# Patient Record
Sex: FEMALE | Race: WHITE | NOT HISPANIC OR LATINO | URBAN - METROPOLITAN AREA
[De-identification: names, ages, dates, MRNs, and addresses within clinical notes are randomized per-mention and may not be internally consistent; named-entity substitution may affect disease eponyms.]

---

## 2024-11-04 ENCOUNTER — TELEMEDICINE (OUTPATIENT)
Dept: FAMILY MEDICINE CLINIC | Facility: TELEHEALTH | Age: 44
End: 2024-11-04
Payer: COMMERCIAL

## 2024-11-04 VITALS — HEART RATE: 66 BPM | TEMPERATURE: 98.9 F

## 2024-11-04 DIAGNOSIS — H66.001 ACUTE SUPPURATIVE OTITIS MEDIA OF RIGHT EAR WITHOUT SPONTANEOUS RUPTURE OF TYMPANIC MEMBRANE, RECURRENCE NOT SPECIFIED: ICD-10-CM

## 2024-11-04 DIAGNOSIS — J06.9 UPPER RESPIRATORY TRACT INFECTION, UNSPECIFIED TYPE: Primary | ICD-10-CM

## 2024-11-04 PROCEDURE — 99213 OFFICE O/P EST LOW 20 MIN: CPT | Performed by: NURSE PRACTITIONER

## 2024-11-04 RX ORDER — PREDNISONE 10 MG/1
TABLET ORAL
Qty: 21 TABLET | Refills: 0 | Status: SHIPPED | OUTPATIENT
Start: 2024-11-04

## 2024-11-04 RX ORDER — ATOVAQUONE AND PROGUANIL HYDROCHLORIDE 250; 100 MG/1; MG/1
TABLET, FILM COATED ORAL DAILY
COMMUNITY

## 2024-11-04 NOTE — PROGRESS NOTES
You have chosen to receive care through a telehealth visit.  Do you consent to use a video/audio connection for your medical care today? Yes     CHIEF COMPLAINT  No chief complaint on file.        HPI  Tana Segovia is a 44 y.o. female  presents with complaint of 4 day history of sinus congestin, sore throat resolved, nasal congestion with clear discharge, swollen tender lymph nodes, PND, clearing throat from PND, mild ear pain.  Denies fever, body aches, chills, wheezing, shortness of breath.     Review of Systems  See HPI    Past Medical History:   Diagnosis Date    Celiac disease     Endocarditis     GERD (gastroesophageal reflux disease)     Seasonal allergies        No family history on file.    Social History     Socioeconomic History    Marital status: Unknown   Tobacco Use    Smoking status: Never     Passive exposure: Never    Smokeless tobacco: Never   Vaping Use    Vaping status: Never Used   Substance and Sexual Activity    Alcohol use: Yes    Drug use: Not Currently    Sexual activity: Defer       Tana Segovia  reports that she has never smoked. She has never been exposed to tobacco smoke. She has never used smokeless tobacco.               Pulse 66   Temp 98.9 °F (37.2 °C)     PHYSICAL EXAM  Physical Exam   Constitutional: She is oriented to person, place, and time. She appears well-developed and well-nourished. She does not have a sickly appearance. She does not appear ill.   HENT:   Head: Normocephalic and atraumatic.   Bilateral TMs are intact, cloudy effusion, right TM has mod-severe erythema; left TM has mild erythema; oropharynx is erythematous with PND and cobblestoning, 1+ bilateral tonsillar enlargement   Pulmonary/Chest: Effort normal.  No respiratory distress.  Neurological: She is alert and oriented to person, place, and time.       Results for orders placed or performed during the hospital encounter of 11/04/24   POC Strep A, PCR (Roche Tammie)    Collection Time: 11/04/24  9:47 AM     Specimen: Swab   Result Value Ref Range    STREP A PCR Not Detected     Internal Control Passed     Lot Number [id number]     Expiration Date 5/31/26    MOSES FLU + SARS PCR    Collection Time: 11/04/24  9:58 AM    Specimen: Nasal Cavity; Swab   Result Value Ref Range    COVID19 Not Detected Not Detected - Ref. Range    POC Influenza A, Molecular Not Detected Negative / Not Detected    POC Influenza B, Molecular Not Detected Negative / Not Detected    Internal Control Passed Passed    Lot Number [id number]     Expiration Date 3/31/26        Diagnoses and all orders for this visit:    1. Upper respiratory tract infection, unspecified type (Primary)  -     MOSES FLU + SARS PCR; Future  -     POC Strep A, PCR (Roche [facility]); Future    2. Acute suppurative otitis media of right ear without spontaneous rupture of tympanic membrane, recurrence not specified  -     amoxicillin-clavulanate (AUGMENTIN) 875-125 MG per tablet; Take 1 tablet by mouth 2 (Two) Times a Day for 10 days.  Dispense: 20 tablet; Refill: 0  -     predniSONE (DELTASONE) 10 MG (21) dose pack; Use as directed on package  Dispense: 21 tablet; Refill: 0    --take medications as prescribed  --increase fluids, rest as needed, tylenol or ibuprofen for pain  --f/u in 5-7 days if no improvement        FOLLOW-UP  As discussed during visit with PCP/[facility] if no improvement or Urgent Care/Emergency Department if worsening of symptoms    Patient verbalizes understanding of medication dosage, comfort measures, instructions for treatment and follow-up.    Tammy Russ, APRN  11/04/2024  09:18 EST    Mode of Visit: Video  Location of patient: Other: [facility]  Location of provider: Home  You have chosen to receive care through a telehealth visit.  The patient has signed the video visit consent form.  The visit included audio and video interaction. No technical issues occurred during this visit.      Note Disclaimer: [facility], we believe that sharing  information builds trust and better   relationships. You are receiving this note because you recently visited [facility]. It is possible you   will see health information before a provider has talked with you about it. This kind of information can   be easy to misunderstand. To help you fully understand what it means for your health, we urge you to   discuss this note with your provider.

## 2025-03-11 ENCOUNTER — TREATMENT (OUTPATIENT)
Dept: PHYSICAL THERAPY | Facility: CLINIC | Age: 45
End: 2025-03-11
Payer: COMMERCIAL

## 2025-03-11 ENCOUNTER — OFFICE VISIT (OUTPATIENT)
Age: 45
End: 2025-03-11
Payer: COMMERCIAL

## 2025-03-11 VITALS
WEIGHT: 214 LBS | BODY MASS INDEX: 33.59 KG/M2 | HEIGHT: 67 IN | SYSTOLIC BLOOD PRESSURE: 120 MMHG | DIASTOLIC BLOOD PRESSURE: 70 MMHG

## 2025-03-11 DIAGNOSIS — S62.609D CLOSED FRACTURE OF PHALANX OF DIGIT OF HAND WITH ROUTINE HEALING, SUBSEQUENT ENCOUNTER: Primary | ICD-10-CM

## 2025-03-11 DIAGNOSIS — S62.633A CLOSED DISPLACED FRACTURE OF DISTAL PHALANX OF LEFT MIDDLE FINGER, INITIAL ENCOUNTER: Primary | ICD-10-CM

## 2025-03-11 PROBLEM — S62.609A FX PHALANGES, HAND-CLOSED: Status: ACTIVE | Noted: 2025-03-11

## 2025-03-11 NOTE — PROGRESS NOTES
Tana Segovia 1980   Diagnosis/ Surgery: left long finger distal phalanx fracture               Date Of Injury: 3/10/25    Date Of Surgery:NA    Hand Dominance: right   History of Present Condition: dog pulled her while she had leash in hand and hit hand on armrest.   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     Pain: moderate     Edema: moderate dorsal DIPJ  Sensibility: WNL   Wound Status:bruising   ROM/ Strength: NT     Splinting:  Patient was measure and fit with a custom fabricated digital cap splint.    Patient was instructed in wearing schedule, precautions and care of the splint during this visit.   Patient was instructed in proper donning/doffing of splint.   Assessment:  Patient was fitted and appropriate splint was fabricated this date.  Patient reported that splint was comfortable and had no complications with the fit of the splint.  Patient was instructed and patient verbalized understanding of precautions, wear and care of the splint.   Patient demonstrated independent donning/doffing of splint during treatment today.  Goals:  Patient was fitted properly with appropriate splint for diagnosis  Patient was educated on precautions, wear schedule and care of splint  Patient demonstrated independence with donning/doffing of the splint.  Splint was provided to Protect Healing Structures, Restrict Mobility, Improve joint alignment.  Plan:  No additional treatment is required for this patient at this time. The patient is therefore discharged from therapy.  Patient advised to contact therapist with any additional questions or concerns regarding the fit and function of the splint.  Patient will be seen for splint issues as needed   Wear Instructions: Off for hygiene           PT SIGNATURE: Zaida Alexander, PT   DATE TREATMENT INITIATED: 3/11/2025    Initial Certification  Certification Period: 6/9/2025  I certify that the therapy services are furnished  while this patient is under my care.  The services outlined above are required by this patient, and will be reviewed every 90 days.     PHYSICIAN:       DATE:     Please sign and return via fax to [phone].. Thank you, [facility].

## 2025-03-11 NOTE — PROGRESS NOTES
"                                                                 [facility]     Office Visit       Date: 03/11/2025   Patient Name: Tana Segovia  MRN: [id number]  YOB: 1980    Referring Physician: Lilibeth Leon APRN     Chief Complaint:   Chief Complaint   Patient presents with    Left Hand - Pain     History of Present Illness:   Tana Segovia is a 44 y.o. female right-hand-dominant presented clinic as a new patient complaints of left long finger pain.  She reports that on 3/9/2025 she sustained an injury to the long finger.  She felt immediate pain and swelling.  She went to the urgent care 1 day later where x-rays were obtained demonstrating a small avulsion fracture of the distal phalanx.  She is placed in a splint and instructed follow-up.  She reports that her pain is 2/10.  She has been compliant with splint wear.  No numbness or tingling.  No other complaints or concerns.    PMH: Celiac disease, GERD, seasonal allergies.    Subjective   Review of Systems:   Review of Systems   Pertinent review of systems per HPI    I reviewed the patient's chief complaint, history of present illness, review of systems, past medical history, surgical history, family history, social history, medications and allergy list in the EMR on 03/11/2025 and agree with the findings above.    Objective    Vital Signs:   Vitals:    03/11/25 1039   BP: 120/70   Weight: 97.1 kg (214 lb)   Height: 170.2 cm (67.01\")     General: No acute distress. Alert and oriented.   Cardiovascular: Palpable radial pulse.   Respiratory: Breathing is nonlabored.   Ortho Exam:  Examination of the left long finger demonstrates mild swelling and ecchymosis along the ulnar aspect of the DIP joint.  No subungual hematoma.  The nail plate is tender to the  Nailbed.  She is tender along the DIP joint ulnarly.  The DIP joint is stable to radial ulnar stresses.  Painless short arc range of motion of the DIP joint.  She is able to " make a composite fist.  Sensations intact light touch at the hand.  Warm and well-perfused distally.    Imaging / Studies:    Imaging Results (Last 24 Hours)       ** No results found for the last 24 hours. **        Left long finger x-rays obtained on 3/10/2025 were personally reviewed interpreted by myself.  There is a small cortical avulsion noted along the ulnar aspect of the distal phalanx near the DIP joint.  The DIP joint is concentrically reduced.  No deformities.  Normal soft tissue window.    Assessment / Plan    Assessment/Plan:   Tana Segovia is a 44 y.o. female with a left long finger distal phalanx ulnar avulsion fracture, DOI/9/25.    I discussed with the patient their clinical and radiographic findings demonstrate a distal phalanx ulnar avulsion fracture.  We had a lengthy discussion regarding the pathophysiology of their diagnosis.  I reviewed the patient her radiographs and relevant anatomy.  Her fracture is acceptable for nonoperative treatment.  She will be placed in a custom thermoplastic cap splint today per the hand therapist.  I would like her to wear this at all times for the next 3 weeks.  She may remove for hygiene and gentle desensitization to the fingertip.  See her back in 3 weeks for reevaluation and discontinuation of the splint.  They were agreeable with the plan.  All questions and concerns were addressed.       ICD-10-CM ICD-9-CM   1. Closed fracture of phalanx of digit of hand with routine healing, subsequent encounter  S62.609D V54.19     Follow Up:   Return in about 3 weeks (around 4/1/2025).      Nella Bradford MD  [facility]

## 2025-03-21 ENCOUNTER — OFFICE VISIT (OUTPATIENT)
Dept: INTERNAL MEDICINE | Facility: CLINIC | Age: 45
End: 2025-03-21
Payer: COMMERCIAL

## 2025-03-21 VITALS
HEIGHT: 67 IN | OXYGEN SATURATION: 98 % | RESPIRATION RATE: 16 BRPM | SYSTOLIC BLOOD PRESSURE: 120 MMHG | WEIGHT: 216.2 LBS | HEART RATE: 74 BPM | TEMPERATURE: 96.7 F | DIASTOLIC BLOOD PRESSURE: 86 MMHG | BODY MASS INDEX: 33.93 KG/M2

## 2025-03-21 DIAGNOSIS — Z87.74 S/P VSD REPAIR: ICD-10-CM

## 2025-03-21 DIAGNOSIS — Z98.890 H/O TRICUSPID VALVE REPAIR: ICD-10-CM

## 2025-03-21 DIAGNOSIS — K90.0 CELIAC DISEASE: ICD-10-CM

## 2025-03-21 DIAGNOSIS — E66.811 CLASS 1 OBESITY DUE TO EXCESS CALORIES WITHOUT SERIOUS COMORBIDITY WITH BODY MASS INDEX (BMI) OF 33.0 TO 33.9 IN ADULT: ICD-10-CM

## 2025-03-21 DIAGNOSIS — Z76.89 ENCOUNTER TO ESTABLISH CARE: Primary | ICD-10-CM

## 2025-03-21 DIAGNOSIS — E66.09 CLASS 1 OBESITY DUE TO EXCESS CALORIES WITHOUT SERIOUS COMORBIDITY WITH BODY MASS INDEX (BMI) OF 33.0 TO 33.9 IN ADULT: ICD-10-CM

## 2025-03-21 DIAGNOSIS — E55.9 VITAMIN D DEFICIENCY: ICD-10-CM

## 2025-03-21 DIAGNOSIS — R73.03 PREDIABETES: ICD-10-CM

## 2025-03-21 PROBLEM — Z86.79 PERSONAL HISTORY OF OTHER DISEASES OF THE CIRCULATORY SYSTEM: Status: ACTIVE | Noted: 2023-09-20

## 2025-03-21 PROBLEM — Q21.0 VSD (VENTRICULAR SEPTAL DEFECT): Status: ACTIVE | Noted: 2025-03-21

## 2025-03-21 PROBLEM — N63.20 MASS OF LEFT BREAST: Status: ACTIVE | Noted: 2025-02-19

## 2025-03-21 PROBLEM — E53.8 FOLIC ACID DEFICIENCY: Status: ACTIVE | Noted: 2024-11-14

## 2025-03-21 PROBLEM — I26.99 OTHER PULMONARY EMBOLISM WITHOUT ACUTE COR PULMONALE: Status: ACTIVE | Noted: 2020-07-18

## 2025-03-21 PROBLEM — I49.3 PVC (PREMATURE VENTRICULAR CONTRACTION): Status: ACTIVE | Noted: 2023-10-01

## 2025-03-21 PROBLEM — Z86.2 HISTORY OF ANEMIA DUE TO VITAMIN B12 DEFICIENCY: Status: ACTIVE | Noted: 2024-11-08

## 2025-03-21 PROBLEM — R00.2 PALPITATIONS: Status: ACTIVE | Noted: 2024-11-08

## 2025-03-21 PROBLEM — I33.0 INFECTIVE ENDOCARDITIS: Status: ACTIVE | Noted: 2020-07-04

## 2025-03-21 PROBLEM — K21.9 GASTROESOPHAGEAL REFLUX DISEASE WITHOUT ESOPHAGITIS: Status: ACTIVE | Noted: 2025-03-21

## 2025-03-21 PROBLEM — I76 SEPTIC EMBOLISM: Status: ACTIVE | Noted: 2020-07-11

## 2025-03-21 PROBLEM — Z86.79 HISTORY OF ENDOCARDITIS: Status: ACTIVE | Noted: 2025-03-21

## 2025-03-21 PROBLEM — I07.1 TRICUSPID VALVE REGURGITATION: Status: ACTIVE | Noted: 2020-07-15

## 2025-03-21 PROBLEM — N92.0 MENORRHAGIA: Status: ACTIVE | Noted: 2023-07-06

## 2025-03-21 NOTE — PROGRESS NOTES
New Patient Office Visit      Date: 2025  Patient Name: Tana Segovia  : 1980   MRN: [id number]     Chief Complaint:    Chief Complaint   Patient presents with    Establish Care    Finger Injury       History of Present Illness: Tana Segovia is a 44 y.o. female who is here today to establish care.  Past medical, surgical, family and social history as outlined below.  Last physical with previous PCP 2024.  Patient shows me lab results from this visit, normal thyroid, CMP, CBC.  Hemoglobin A1c 5.8.  Low vitamin B12 and folate-these were since rechecked and within normal limits.  Last mammogram on 2024.  Over the last 6 months patient has increased her physical activity and has been following with a dietitian.  Has noticed she has decreased 1 pant size.    Patient has no complaints today.  Had recent left ulnar avulsion fracture.  Patient followed up in office with Dr. Bradford on 2025.  Recommend splint for 3 weeks and will see patient back in office.  Patient reports pain is significantly improved.    Subjective      Review of Systems:   Review of Systems   Constitutional:  Negative for chills and fever.   HENT:  Negative for congestion and rhinorrhea.    Respiratory:  Negative for shortness of breath.    Cardiovascular:  Negative for chest pain.   Gastrointestinal:  Negative for abdominal pain, constipation and diarrhea.   Endocrine: Negative for polydipsia and polyuria.   Genitourinary:  Negative for dysuria.   Musculoskeletal:  Negative for myalgias.   Skin:  Negative for skin lesions.   Allergic/Immunologic: Negative for environmental allergies.   Neurological:  Negative for dizziness and headache.   Psychiatric/Behavioral:  Negative for suicidal ideas.        Past Medical History:   Past Medical History:   Diagnosis Date    Celiac disease     Diabetes mellitus 2024    Slightly elevated A1C    Endocarditis 2020    Fracture, finger 3/2025    GERD  (gastroesophageal reflux disease) 2000    Mainly associated with celiac    Heart murmur 1981    VSD    Obesity     Gained 30 lbs over the last couple of years.    Seasonal allergies        Past Surgical History:   Past Surgical History:   Procedure Laterality Date    CARDIAC SURGERY  07/2020    VSD repair    TUBAL ABDOMINAL LIGATION      WISDOM TOOTH EXTRACTION         Family History:   Family History   Problem Relation Age of Onset    Anxiety disorder Mother     Arthritis Mother     Birth defects Mother         Scoliosis    Diabetes Mother         Pre-diabetic for awhile    Hyperlipidemia Mother     Scoliosis Mother     Hyperlipidemia Father     Arthritis Maternal Grandmother        Social History:   Social History     Socioeconomic History    Marital status:    Tobacco Use    Smoking status: Never     Passive exposure: Never    Smokeless tobacco: Never   Vaping Use    Vaping status: Never Used   Substance and Sexual Activity    Alcohol use: Yes     Alcohol/week: 4.0 standard drinks of alcohol     Types: 4 Glasses of wine per week    Drug use: Never    Sexual activity: Yes     Partners: Male     Birth control/protection: Tubal ligation       Medications:     Current Outpatient Medications:     aspirin 81 MG EC tablet, Take 1 tablet by mouth Daily., Disp: , Rfl:     atovaquone-proguanil (MALARONE) 250-100 MG tablet per tablet, Take  by mouth Daily. prn, Disp: , Rfl:     famotidine (Pepcid) 20 MG tablet, Take 1 tablet as needed by oral route., Disp: , Rfl:     fluticasone (FLONASE) 50 MCG/ACT nasal spray, into the nostril(s) as directed by provider., Disp: , Rfl:     folic acid (FOLVITE) 1 MG tablet, Take 1 tablet by mouth Daily., Disp: , Rfl:     Loratadine (Claritin) 10 MG capsule, Take  by mouth., Disp: , Rfl:     metoprolol succinate XL (TOPROL-XL) 50 MG 24 hr tablet, Take 1 tablet (50 mg) by mouth in the morning and 0.5 tablet (25 mg) in the evening., Disp: , Rfl:     Vitamin D, Cholecalciferol, 50 MCG  "(2000 UT) capsule, Take  by mouth., Disp: , Rfl:     Allergies:   Allergies   Allergen Reactions    Gluten Meal GI Intolerance     Per patient due to celiac disease    Sulfa Antibiotics Nausea Only    Adhesive Tape Rash       Objective     Physical Exam:  Vital Signs:   Vitals:    03/21/25 0956   BP: 120/86   Pulse: 74   Resp: 16   Temp: 96.7 °F (35.9 °C)   TempSrc: Temporal   SpO2: 98%   Weight: 98.1 kg (216 lb 3.2 oz)   Height: 170.2 cm (67.01\")   PainSc: 0-No pain     Body mass index is 33.85 kg/m².   Facility age limit for growth %viraj is 20 years.       Physical Exam  Vitals and nursing note reviewed.   Constitutional:       General: She is not in acute distress.     Appearance: Normal appearance.   Eyes:      Extraocular Movements: Extraocular movements intact.      Conjunctiva/sclera: Conjunctivae normal.   Pulmonary:      Effort: Pulmonary effort is normal. No respiratory distress.   Neurological:      General: No focal deficit present.      Mental Status: She is alert and oriented to person, place, and time. Mental status is at baseline.   Psychiatric:         Mood and Affect: Mood normal.         Behavior: Behavior normal.         Assessment / Plan      Assessment/Plan:   Diagnoses and all orders for this visit:    1. Encounter to establish care (Primary)    2. Prediabetes    3. Celiac disease    4. Class 1 obesity due to excess calories without serious comorbidity with body mass index (BMI) of 33.0 to 33.9 in adult    5. H/O tricuspid valve repair    6. S/P VSD repair    7. Vitamin D deficiency         Follow Up:   Return in about 8 months (around 11/10/2025) for Annual.      GRAEME [facility]   "

## 2025-04-03 ENCOUNTER — OFFICE VISIT (OUTPATIENT)
Dept: ORTHOPEDIC SURGERY | Facility: CLINIC | Age: 45
End: 2025-04-03
Payer: COMMERCIAL

## 2025-04-03 VITALS
HEIGHT: 67 IN | DIASTOLIC BLOOD PRESSURE: 82 MMHG | WEIGHT: 216 LBS | BODY MASS INDEX: 33.9 KG/M2 | SYSTOLIC BLOOD PRESSURE: 120 MMHG

## 2025-04-03 DIAGNOSIS — S62.609D CLOSED FRACTURE OF PHALANX OF DIGIT OF HAND WITH ROUTINE HEALING, SUBSEQUENT ENCOUNTER: Primary | ICD-10-CM

## 2025-04-03 NOTE — PROGRESS NOTES
[facility]     Office Visit       Date: 04/03/2025   Patient Name: Tana Segovia  MRN: [id number]  YOB: 1980    Referring Physician: No ref. provider found     Chief Complaint:   Chief Complaint   Patient presents with    Follow-up     3 week f/u -- left long finger distal phalanx ulnar avulsion fracture, DOI 3/9/25      History of Present Illness:   Tana Segovia is a 44 y.o. female right-hand-dominant presented clinic for follow-up of left long finger distal phalanx ulnar avulsion fracture, DOI 3/9/2025.  At her last clinic visit she was placed in a custom thermoplastic splint per the hand therapist.  She reports that she has been using the splint but has been weaning out over the past couple days.  She has had improvements in her pain swelling and bruising.  She denies any pain currently.  Overall she feels improved.  No other complaints or concerns.    Subjective   Review of Systems:   Review of Systems   Constitutional: Negative.  Negative for chills, fatigue and fever.   HENT: Negative.  Negative for congestion and dental problem.    Eyes: Negative.  Negative for blurred vision.   Respiratory: Negative.  Negative for shortness of breath.    Cardiovascular: Negative.  Negative for leg swelling.   Gastrointestinal: Negative.  Negative for abdominal pain.   Endocrine: Negative.  Negative for polyuria.   Genitourinary: Negative.  Negative for difficulty urinating.   Musculoskeletal:  Positive for arthralgias.   Skin: Negative.    Allergic/Immunologic: Negative.    Neurological: Negative.    Hematological: Negative.  Negative for adenopathy.   Psychiatric/Behavioral: Negative.  Negative for behavioral problems.       Pertinent review of systems per HPI    I reviewed the patient's chief complaint, history of present illness, review of systems, past medical history, surgical history, family history, social history,  "medications and allergy list in the EMR on 04/03/2025 and agree with the findings above.    Objective    Vital Signs:   Vitals:    04/03/25 1012   BP: 120/82   Weight: 98 kg (216 lb)   Height: 170.2 cm (67.01\")     General: No acute distress. Alert and oriented.   Cardiovascular: Palpable radial pulse.   Respiratory: Breathing is nonlabored.   Ortho Exam:  Examination of the left long finger demonstrates mild tenderness along the ulnar aspect of the DIP joint.  Improvement in the swelling and ecchymosis.  Nontender throughout remainder of the digit.  The DIP joint is stable to radial ulnar stresses.  She is 1 cm to palm with composite fist.  Sensations intact light touch at the hand.  Warm and well-perfused distally.    Imaging / Studies:    Imaging Results (Last 24 Hours)       ** No results found for the last 24 hours. **          Assessment / Plan    Assessment/Plan:   Tana Segovia is a 44 y.o. female with left long finger distal phalanx ulnar avulsion fracture, DOI 3/9/2025.    Patient has done well since her injury with a course of conservative treatment.  She may discontinue use of her splint.  I discussed unrestricted range of motion of the digit and the importance of making a composite fist.  I offered her a follow-up appointment 6 weeks, however she like to call if she is not improving.  This is reasonable.  Otherwise she may follow-up with me as needed.  All question concerns were addressed.  She is agreeable.      ICD-10-CM ICD-9-CM   1. Closed fracture of phalanx of digit of hand with routine healing, subsequent encounter  S62.609D V54.19     Follow Up:   Return if symptoms worsen or fail to improve.      Nella Bradford MD  [facility]  "

## 2025-07-07 ENCOUNTER — OFFICE VISIT (OUTPATIENT)
Dept: ORTHOPEDIC SURGERY | Facility: CLINIC | Age: 45
End: 2025-07-07
Payer: COMMERCIAL

## 2025-07-07 VITALS
BODY MASS INDEX: 33.3 KG/M2 | WEIGHT: 212.2 LBS | SYSTOLIC BLOOD PRESSURE: 114 MMHG | HEIGHT: 67 IN | DIASTOLIC BLOOD PRESSURE: 86 MMHG

## 2025-07-07 DIAGNOSIS — M79.645 FINGER PAIN, LEFT: ICD-10-CM

## 2025-07-07 DIAGNOSIS — M67.442 GANGLION CYST OF FINGER OF LEFT HAND: Primary | ICD-10-CM

## 2025-07-07 PROCEDURE — 99214 OFFICE O/P EST MOD 30 MIN: CPT | Performed by: STUDENT IN AN ORGANIZED HEALTH CARE EDUCATION/TRAINING PROGRAM

## 2025-07-07 RX ORDER — TIRZEPATIDE 2.5 MG/.5ML
INJECTION, SOLUTION SUBCUTANEOUS
COMMUNITY

## 2025-07-07 NOTE — PROGRESS NOTES
[facility] Orthopedic     Office Visit       Date: 07/07/2025   Patient Name: Tana Segovia  MRN: [id number]  YOB: 1980    Referring Physician: No ref. provider found     Chief Complaint:   Chief Complaint   Patient presents with    Left Hand - Pain     History of Present Illness:   Tana Segovia is a 44 y.o. female right-hand-dominant presented clinic as an established patient for a new problem.  I have treated the patient in the past for a left long finger distal phalanx ulnar avulsion fracture, DOI 3/9/2025.  She reports that she has had a small cyst on the dorsal aspect of her left ring finger for the past 3 to 4 years.  This has not been growing in size recently.  This creates stiffness and also pain when bumped or hit.  No other complaints or concerns.    Subjective   Review of Systems:   Review of Systems   Constitutional:  Negative for chills, fever, unexpected weight gain and unexpected weight loss.   HENT:  Negative for congestion, postnasal drip and rhinorrhea.    Eyes:  Negative for blurred vision.   Respiratory:  Negative for shortness of breath.    Cardiovascular:  Negative for leg swelling.   Gastrointestinal:  Negative for abdominal pain, nausea and vomiting.   Genitourinary:  Negative for difficulty urinating.   Musculoskeletal:  Positive for arthralgias. Negative for gait problem, joint swelling and myalgias.   Skin:  Negative for skin lesions and wound.   Neurological:  Negative for dizziness, weakness, light-headedness and numbness.   Hematological:  Does not bruise/bleed easily.   Psychiatric/Behavioral:  Negative for depressed mood.       Pertinent review of systems per HPI    I reviewed the patient's chief complaint, history of present illness, review of systems, past medical history, surgical history, family history, social history, medications and allergy list in the EMR on 07/07/2025 and agree with the  "findings above.    Objective    Vital Signs:   Vitals:    07/07/25 0759   BP: 114/86   Weight: 96.3 kg (212 lb 3.2 oz)   Height: 170.2 cm (67\")     General: No acute distress. Alert and oriented.   Cardiovascular: Palpable radial pulse.   Respiratory: Breathing is nonlabored.   Ortho Exam:  Examination of the left ring finger demonstrates a small 7 x 8 mm palpable cyst along the dorsal aspect of the PIP joint.  This is somewhat mobile.  This is mildly tender to palpation.  She has full digit range of motion and is able to make a composite fist.  Sensation is intact light touch throughout the hand.  Warm perfused distally.    Imaging / Studies:    Imaging Results (Last 24 Hours)       Procedure Component Value Units Date/Time    XR Finger 2+ View Left [[id number]] Resulted: 07/07/25 0821     Updated: 07/07/25 0821    Narrative:      Left index finger X-Ray    Indication: Pain    Views:  PA, Lateral    Comparison: None    Findings:  No fractures or dislocation. No bony lesions. Normal soft tissues.   Maintained joint spaces without osteophyte formation    Impression:   No acute findings               Assessment / Plan    Assessment/Plan:   Tana Segovia is a 44 y.o. female with a left ring finger dorsal cyst.    I discussed with the patient their clinical and radiographic findings demonstrate a ring finger dorsal cyst.  We had a lengthy discussion regarding the pathophysiology of their diagnosis.  I discussed with the patient the benign nature of the cyst that originate either from the PIP joint itself or the extensor tendons.  We discussed the operative and nonoperative treatment options.  Nonoperative treatment options include oral topical anti-inflammatories, bracing, therapy, aspiration.  Definitive treatment is in the form of surgical excision.  The patient expressed understanding of all of her options and wished for definitive excision.  Explained to her the risk, benefits, alternatives and prognosis, and she " elects to proceed with left ring finger cyst excision.     The risks and benefits of the procedure were discussed with the patient and/or appropriate guardian, which include but are not limited to: the risk of bleeding, pain, infection, wound complications, neurovascular damage, post-operative stiffness, tendon and/or ligament injury, persistent pain, need for additional surgeries in the future, and general risks from anesthesia.  This excision specific risk include damage to surrounding structures, digital stiffness, recurrence, need for additional procedures.  We also discussed the post-operative rehabilitation, length of immobilization, the need for therapy, and the overall expected outcomes from the procedure. Proper time was given to answer the patient's questions regarding the procedure. The patient expressed understanding. Knowing what the risks are and what the conservative treatment is, the patient elected to forgo any further conservative treatment options and proceed with the surgical intervention. Surgical consent was obtained in the clinic and signed by myself and the patient. They will follow up with me postoperatively.       ICD-10-CM ICD-9-CM   1. Ganglion cyst of finger of left hand  M67.442 727.43   2. Finger pain, left  M79.645 729.5     Follow Up:   Return for Follow Up- After surgery.      Nella Bradford MD  [facility] & Hand Surgeon

## 2025-07-13 ENCOUNTER — TELEMEDICINE (OUTPATIENT)
Dept: FAMILY MEDICINE CLINIC | Facility: TELEHEALTH | Age: 45
End: 2025-07-13
Payer: COMMERCIAL

## 2025-07-13 ENCOUNTER — E-VISIT (OUTPATIENT)
Dept: ADMINISTRATIVE | Facility: OTHER | Age: 45
End: 2025-07-13
Payer: COMMERCIAL

## 2025-07-13 DIAGNOSIS — K59.03 DRUG-INDUCED CONSTIPATION: Primary | ICD-10-CM

## 2025-07-13 NOTE — PROGRESS NOTES
No chief complaint on file.      Video Visit Reason:   Free Text Description: Suggestions for constipation relief same day.  Subjective   Tana Segovia is a 44 y.o. female.     History of Present Illness  The patient presents via virtual visit for evaluation of constipation.    She has been experiencing constipation since starting Zepbound, with her third injection recently administered. She reports a noticeable slowing in her bowel movements, although she was able to have a bowel movement yesterday. Today, she feels the urge to defecate but anticipates significant discomfort. She describes her stool as hard and difficult to pass. Over the past 3 weeks, she has noticed a decrease in the frequency of her bowel movements, with only 2 instances of no bowel movement. She also mentions that her stools have become firmer. She is seeking advice on a fast-acting solution to soften her stool as she is due to travel by plane tomorrow and wishes to avoid discomfort during the journey. She has been using fiber gummies to manage her symptoms and has reached out to her primary care physician for further guidance.        The following portions of the patient's history were reviewed and updated as appropriate: allergies, current medications, past medical history, and problem list.      Past Medical History:   Diagnosis Date    Celiac disease     Diabetes mellitus 11/2024    Slightly elevated A1C    Endocarditis 2020    Fracture, finger 3/2025    GERD (gastroesophageal reflux disease) 2000    Mainly associated with celiac    Heart murmur 1981    VSD    Obesity     Gained 30 lbs over the last couple of years.    Seasonal allergies      Social History     Socioeconomic History    Marital status:    Tobacco Use    Smoking status: Never     Passive exposure: Never    Smokeless tobacco: Never   Vaping Use    Vaping status: Never Used   Substance and Sexual Activity    Alcohol use: Yes     Alcohol/week: 4.0 standard drinks of  alcohol     Types: 4 Glasses of wine per week    Drug use: Never    Sexual activity: Yes     Partners: Male     Birth control/protection: Tubal ligation     medication documentation: reviewed and updated with patient and   Current Outpatient Medications:     FIBER ADULT GUMMIES PO, , Disp: , Rfl:     aspirin 81 MG EC tablet, Take 1 tablet by mouth Daily., Disp: , Rfl:     atovaquone-proguanil (MALARONE) 250-100 MG tablet per tablet, Take  by mouth Daily. prn, Disp: , Rfl:     famotidine (Pepcid) 20 MG tablet, Take 1 tablet as needed by oral route., Disp: , Rfl:     fluticasone (FLONASE) 50 MCG/ACT nasal spray, into the nostril(s) as directed by provider., Disp: , Rfl:     folic acid (FOLVITE) 1 MG tablet, Take 1 tablet by mouth Daily., Disp: , Rfl:     Loratadine (Claritin) 10 MG capsule, Take  by mouth., Disp: , Rfl:     metoprolol succinate XL (TOPROL-XL) 50 MG 24 hr tablet, Take 1 tablet (50 mg) by mouth in the morning and 0.5 tablet (25 mg) in the evening., Disp: , Rfl:     Vitamin D, Cholecalciferol, 50 MCG (2000 UT) capsule, Take  by mouth., Disp: , Rfl:     Zepbound 2.5 MG/0.5ML solution auto-injector, INJECT 2.5 MG SUBCUTANEOUSLY WEEKLY FOR 28 DAYS (PA DENIED), Disp: , Rfl:   Review of Systems  See HPI  Objective   Physical Exam  Pulmonary:      Effort: Pulmonary effort is normal.   Abdominal:      Tenderness: There is no abdominal tenderness.   Neurological:      Mental Status: She is alert.         Assessment & Plan   Diagnoses and all orders for this visit:    1. Drug-induced constipation (Primary)     - Constipation likely due to Zepbound, causing stool impaction in the rectal area.  - Advised to use Fleet suppository or enema to lubricate and facilitate stool passage.  - Recommended daily MiraLAX with adequate water intake when consuming fiber gummies.  - Suggested magnesium citrate for rapid stool softening, with caution that it won't affect already formed hard stools. Consider using Colace as a stool  softener once or twice daily.             Follow Up:  If your symptoms are not resolving by the completion of your treatment or are worsening, see your primary care provider for follow up. If you don't have a primary care provider, you may go to any Urgent Care for re-evaluation. If you develop any life threatening symptoms, go to the nearest Emergency Department immediately or call EMS.       Patient or patient representative verbalized consent for the use of Ambient Listening during the visit with  ELYSIA Garnica for chart documentation. 7/13/2025  08:08 EDT        The use of  Video Visit was utilized during this visit, using both Buyou and Foodist/Epic. The use of a video visit has been reviewed with the patient and verbal informed consent has been obtained. No technical difficulties occurred during the visit.    is located at [address] [place] [id number]  Provider is located at [place], [place]

## 2025-07-13 NOTE — E-VISIT ESCALATED
Status: Referred Out  Date: 2025 06:39:07  Acuity Level: Not applicable  Referral message: Based on the information you provided during your interview, eVisit is not appropriate for treating your condition.  Patient: Tana Segovia  Patient : 1980  Patient Address: [address] [place] [address]  Patient Phone: [phone]  Clinician Response: Unavailable  Diagnosis: Unavailable  Diagnosis ICD: Unavailable     Patient Interview Questions and Responses: None available

## 2025-07-13 NOTE — PATIENT INSTRUCTIONS
Constipation, Adult  Constipation is when a person has trouble pooping (having a bowel movement). When you have this condition, you may poop fewer than 3 times a week. Your poop (stool) may also be dry, hard, or bigger than normal.  Follow these instructions at home:  Eating and drinking    Eat foods that have a lot of fiber, such as:  Fresh fruits and vegetables.  Whole grains.  Beans.  Eat less of foods that are low in fiber and high in fat and sugar, such as:  French fries.  Hamburgers.  Cookies.  Candy.  Soda.  Drink enough fluid to keep your pee (urine) pale yellow.  General instructions  Exercise regularly or as told by your doctor. Try to do 150 minutes of exercise each week.  Go to the restroom when you feel like you need to poop. Do not hold it in.  Take over-the-counter and prescription medicines only as told by your doctor. These include any fiber supplements.  When you poop:  Do deep breathing while relaxing your lower belly (abdomen).  Relax your pelvic floor. The pelvic floor is a group of muscles that support the rectum, bladder, and intestines (as well as the uterus in women).  Watch your condition for any changes. Tell your doctor if you notice any.  Keep all follow-up visits as told by your doctor. This is important.  Contact a doctor if:  You have pain that gets worse.  You have a fever.  You have not pooped for 4 days.  You vomit.  You are not hungry.  You lose weight.  You are bleeding from the opening of the butt (anus).  You have thin, pencil-like poop.  Get help right away if:  You have a fever, and your symptoms suddenly get worse.  You leak poop or have blood in your poop.  Your belly feels hard or bigger than normal (bloated).  You have very bad belly pain.  You feel dizzy or you faint.  Summary  Constipation is when a person poops fewer than 3 times a week, has trouble pooping, or has poop that is dry, hard, or bigger than normal.  Eat foods that have a lot of fiber.  Drink enough fluid  to keep your pee (urine) pale yellow.  Take over-the-counter and prescription medicines only as told by your doctor. These include any fiber supplements.  This information is not intended to replace advice given to you by your health care provider. Make sure you discuss any questions you have with your health care provider.  Document Revised: 11/01/2023 Document Reviewed: 11/01/2023  [employer] Patient Education © 2024 [employer]

## 2025-07-29 ENCOUNTER — TELEPHONE (OUTPATIENT)
Dept: ORTHOPEDIC SURGERY | Facility: CLINIC | Age: 45
End: 2025-07-29
Payer: COMMERCIAL

## 2025-07-30 ENCOUNTER — DOCUMENTATION (OUTPATIENT)
Age: 45
End: 2025-07-30
Payer: COMMERCIAL

## 2025-07-30 ENCOUNTER — OUTSIDE FACILITY SERVICE (OUTPATIENT)
Dept: ORTHOPEDIC SURGERY | Facility: CLINIC | Age: 45
End: 2025-07-30
Payer: COMMERCIAL

## 2025-07-30 DIAGNOSIS — M67.442 GANGLION CYST OF FINGER OF LEFT HAND: Primary | ICD-10-CM

## 2025-07-30 PROCEDURE — 26160 REMOVE TENDON SHEATH LESION: CPT | Performed by: STUDENT IN AN ORGANIZED HEALTH CARE EDUCATION/TRAINING PROGRAM

## 2025-07-30 RX ORDER — TRAMADOL HYDROCHLORIDE 50 MG/1
50 TABLET ORAL EVERY 8 HOURS PRN
Qty: 10 TABLET | Refills: 0 | Status: SHIPPED | OUTPATIENT
Start: 2025-07-30

## 2025-07-30 NOTE — PROGRESS NOTES
ORTHOPEDIC OPERATIVE REPORT    PATIENT NAME: Tana Segovia     YOB: 1980    MRN: [id number]     LOCATION: [facility]    DATE OF SURGERY: 07/30/25     PREOPERATIVE DIAGNOSIS:   Left ring finger dorsal PIP joint cyst    POSTOPERATIVE DIAGNOSIS:  Left ring finger dorsal PIP joint cyst    PROCEDURE PERFORMED:  Left ring finger dorsal PIP joint cyst excision    CPT CODE:  98416    SURGEON: Nella Bradford MD     ASSISTANT: None     ANESTHESIA:  50:50 mixture of 0.5% Marcaine mixed with 1% lidocaine plain    SPECIMENS: None    FINGER TURNICOT: 4 minutes    ESTIMATED BLOOD LOSS: Minimal    COMPLICATIONS: None    IMPLANTS: None    INDICATIONS FOR PROCEDURE:  Tana nuemann is a 44-year-old right-hand-dominant female who presented to clinic with complaints of pain and cyst formation to the  t ring finger. Clinical examination was consistent with a digital cyst overlying the dorsal aspect of the PIP joint.  Radiographs of the affected digit demonstrated no acute findings. The patient was offered conservative treatment including observation, coban wrapping and splinting. The patient expressed understanding of their options and elected for cyst removal as it was deemed bothersome enough. After discussion of the risks, benefits, alternatives and prognosis, they elected to proceed with left ring finger cyst excision.    DESCRIPTION OF PROCEDURE:   The patient was identified in the preoperative holding area utilizing two patient identifiers. The surgical site was marked. Local anesthetic consisting of a 50:50 mixture of 0.5% Marcaine mixed with 1% lidocaine without epinephrine was injected into the affected digit via a digital block in the preoperative holding area. They were taken back to the operating room and placed supine on the operative table. The operative extremity was prepped and draped in a standard sterile fashion. A surgical time out was performed that confirmed the correct site, procedure and  laterality.  Preoperative antibiotics were given.    A longitudinal l incision was marked overlying the dorsal cyst. A finger tourniquet was placed to the affected digit. A 15 blade was used to incise through the skin and subcutaneous tissue.  The cyst was readily encountered.  This appeared adherent to the dorsal aspect of the extensor apparatus.  A mixture of sharp and blunt dissection was used to remove the cyst.  This did not violate the extensor mechanism and did not track deep.  This appeared consistent with a ganglion cyst.  With the cyst excised, this measured 3 x 5 x 2 mm.  The remainder of the wound was inspected and no additional pathology was noted.    The wound was gently irrigated with normal saline. 4-0 nylon was used to close the wound in a simple suture pattern. The finger tourniquet was released and gentle pressure applied to the incision to achieve hemostasis. A 4x4 gauze and coban was used to dress the wound.  The patient was brought to the PACU in good and stable condition.  All counts were correct at the end of the case.    POSTOPERATIVE PLAN:  No lifting greater than 5 pounds with the operative extremity.  2.  Over the counter Tylenol and/or Advil/Aleve/Motrin for pain control.   3.  Dressings may be removed in 5 days and replaced with a dry daily dressing.  4.  Follow up in 10-14 days as scheduled.    Nella Bradford MD  [facility]

## 2025-08-11 ENCOUNTER — OFFICE VISIT (OUTPATIENT)
Dept: ORTHOPEDIC SURGERY | Facility: CLINIC | Age: 45
End: 2025-08-11
Payer: COMMERCIAL

## 2025-08-11 VITALS — TEMPERATURE: 96.9 F

## 2025-08-11 DIAGNOSIS — M67.442 GANGLION CYST OF FINGER OF LEFT HAND: Primary | ICD-10-CM

## 2025-08-11 PROCEDURE — 99024 POSTOP FOLLOW-UP VISIT: CPT | Performed by: STUDENT IN AN ORGANIZED HEALTH CARE EDUCATION/TRAINING PROGRAM

## 2025-08-13 ENCOUNTER — TREATMENT (OUTPATIENT)
Dept: PHYSICAL THERAPY | Facility: CLINIC | Age: 45
End: 2025-08-13
Payer: COMMERCIAL

## 2025-08-13 DIAGNOSIS — M79.642 PAIN OF LEFT HAND: Primary | ICD-10-CM
